# Patient Record
Sex: FEMALE | Race: WHITE | NOT HISPANIC OR LATINO | URBAN - METROPOLITAN AREA
[De-identification: names, ages, dates, MRNs, and addresses within clinical notes are randomized per-mention and may not be internally consistent; named-entity substitution may affect disease eponyms.]

---

## 2017-07-12 NOTE — PATIENT DISCUSSION
POSTERIOR CAPSULAR FIBROSIS, OD:  VISUALLY SIGNIFICANT. OPTION OF YAG LASER VERSUS UPDATING GLASSES VERSUS FOLLOWING DISCUSSED. RBA'S DISCUSSED, PATIENT UNDERSTANDS AND DESIRES YAG LASER TO INCREASE VISION FOR DRIVING, READING AND PROBLEMS WITH GLARE. SCHEDULE YAG LASER OD.

## 2019-10-29 NOTE — PATIENT DISCUSSION
CATARACTS, OS: VISUALLY SIGNIFICANT. OPTION OF SURGERY VERSUS FOLLOWING VERSUS UPDATING GLASSES DISCUSSED. RBA'S DISCUSSED, PATIENT UNDERSTANDS AND DESIRES SURGERY TO INCREASE VISION FOR READING AND WATCHING TV.  SCHEDULE CATARACT SURGERY/PRE-OP OS.

## 2019-11-14 NOTE — PATIENT DISCUSSION
CATARACT OS: RBA'S DISCUSSED, PATIENT UNDERSTANDS AND DESIRES TO PROCEED WITH SURGERY. CONSENT READ AND SIGNED. PATIENT DESIRES PANOPTICS TORIC MULTIFOCAL .

## 2019-12-12 NOTE — PATIENT DISCUSSION
S/P PC IOL, OS. DOING WELL. CONTINUE DROPS AS DIRECTED. SPECS RX OFFERED. RX ARC IN SPECS TO MINIMIZE GLARE. RETURN FOR FOLLOW-UP AS SCHEDULED.

## 2021-10-07 NOTE — PATIENT DISCUSSION
VISUALLY SIGNIFICANT. OPTION OF YAG LASER VERSUS UPDATING GLASSES VERSUS FOLLOWING DISCUSSED. RBA'S DISCUSSED. RBA'S DISCUSSED, PATIENT UNDERSTANDS AND DESIRES YAG LASER TO INCREASE VISION FOR READING, WATCHING TV, AND DRIVING. SCHEDULE YAG OS.

## 2021-10-07 NOTE — PATIENT DISCUSSION
NO HOLES. NO TEARS. RETINAL DETATCHMENT WARNINGS DISCUSSED. FLOATERS AND FLASHES BROCHURE GIVEN. RETURN FOR FOLLOW UP AS SCHEDULED.

## 2021-11-18 NOTE — PATIENT DISCUSSION
STABLE. NOT VISUALLY SIGNIFICANT. REFER PATIENT TO RETINA SPECIALIST IF WORSENS. FOLLOW UP AS SCHEDULED.

## 2022-05-11 ENCOUNTER — PREPPED CHART (OUTPATIENT)
Dept: URBAN - METROPOLITAN AREA CLINIC 19 | Facility: CLINIC | Age: 62
End: 2022-05-11

## 2022-05-11 ENCOUNTER — ESTABLISHED PATIENT (OUTPATIENT)
Dept: URBAN - METROPOLITAN AREA CLINIC 19 | Facility: CLINIC | Age: 62
End: 2022-05-11

## 2022-05-11 DIAGNOSIS — H02.831: ICD-10-CM

## 2022-05-11 DIAGNOSIS — H52.223: ICD-10-CM

## 2022-05-11 DIAGNOSIS — H02.832: ICD-10-CM

## 2022-05-11 DIAGNOSIS — H02.834: ICD-10-CM

## 2022-05-11 DIAGNOSIS — H25.813: ICD-10-CM

## 2022-05-11 DIAGNOSIS — H02.835: ICD-10-CM

## 2022-05-11 PROCEDURE — 92014 COMPRE OPH EXAM EST PT 1/>: CPT

## 2022-05-11 ASSESSMENT — VISUAL ACUITY
OS_CC: 20/30-1
OU_CC: J2
OD_CC: 20/30-1

## 2022-05-11 ASSESSMENT — KERATOMETRY
OD_K1POWER_DIOPTERS: 44.75
OD_K2POWER_DIOPTERS: 46.50
OS_K1POWER_DIOPTERS: 44.75
OS_AXISANGLE2_DEGREES: 108
OD_AXISANGLE2_DEGREES: 85
OS_AXISANGLE_DEGREES: 18
OD_AXISANGLE_DEGREES: 175
OS_K2POWER_DIOPTERS: 46.25

## 2022-05-11 ASSESSMENT — TONOMETRY
OD_IOP_MMHG: 7
OS_IOP_MMHG: 9

## 2023-05-18 ENCOUNTER — ESTABLISHED PATIENT (OUTPATIENT)
Dept: URBAN - METROPOLITAN AREA CLINIC 19 | Facility: CLINIC | Age: 63
End: 2023-05-18

## 2023-05-18 DIAGNOSIS — H25.813: ICD-10-CM

## 2023-05-18 DIAGNOSIS — H02.831: ICD-10-CM

## 2023-05-18 DIAGNOSIS — H52.223: ICD-10-CM

## 2023-05-18 DIAGNOSIS — H02.835: ICD-10-CM

## 2023-05-18 PROCEDURE — 92015 DETERMINE REFRACTIVE STATE: CPT

## 2023-05-18 PROCEDURE — 92014 COMPRE OPH EXAM EST PT 1/>: CPT

## 2023-05-18 ASSESSMENT — TONOMETRY
OS_IOP_MMHG: 10
OD_IOP_MMHG: 10

## 2023-05-18 ASSESSMENT — KERATOMETRY
OD_AXISANGLE2_DEGREES: 85
OS_AXISANGLE2_DEGREES: 108
OS_K2POWER_DIOPTERS: 46.25
OD_K2POWER_DIOPTERS: 46.50
OD_AXISANGLE_DEGREES: 175
OS_AXISANGLE_DEGREES: 18
OD_K1POWER_DIOPTERS: 44.75
OS_K1POWER_DIOPTERS: 44.75

## 2023-05-18 ASSESSMENT — VISUAL ACUITY
OU_CC: 20/25+1
OD_CC: 20/30
OS_CC: 20/25

## 2024-05-23 ENCOUNTER — ESTABLISHED PATIENT (OUTPATIENT)
Dept: URBAN - METROPOLITAN AREA CLINIC 19 | Facility: CLINIC | Age: 64
End: 2024-05-23

## 2024-05-23 DIAGNOSIS — H02.834: ICD-10-CM

## 2024-05-23 DIAGNOSIS — H02.835: ICD-10-CM

## 2024-05-23 DIAGNOSIS — H04.123: ICD-10-CM

## 2024-05-23 DIAGNOSIS — H25.813: ICD-10-CM

## 2024-05-23 DIAGNOSIS — H52.223: ICD-10-CM

## 2024-05-23 DIAGNOSIS — H02.831: ICD-10-CM

## 2024-05-23 DIAGNOSIS — Z79.899: ICD-10-CM

## 2024-05-23 DIAGNOSIS — H02.832: ICD-10-CM

## 2024-05-23 PROCEDURE — 92015 DETERMINE REFRACTIVE STATE: CPT

## 2024-05-23 PROCEDURE — 92014 COMPRE OPH EXAM EST PT 1/>: CPT

## 2024-05-23 PROCEDURE — 92250 FUNDUS PHOTOGRAPHY W/I&R: CPT

## 2024-05-23 ASSESSMENT — KERATOMETRY
OD_AXISANGLE_DEGREES: 173
OD_AXISANGLE2_DEGREES: 83
OD_K1POWER_DIOPTERS: 45.50
OS_K2POWER_DIOPTERS: 47.25
OS_AXISANGLE_DEGREES: 173
OS_AXISANGLE2_DEGREES: 83
OS_K1POWER_DIOPTERS: 44.75
OD_K2POWER_DIOPTERS: 47.25

## 2024-05-23 ASSESSMENT — VISUAL ACUITY
OD_CC: 20/60
OU_CC: 20/25
OS_CC: 20/40
OD_PH: 20/50

## 2024-05-23 ASSESSMENT — TONOMETRY
OD_IOP_MMHG: 10
OS_IOP_MMHG: 12

## 2024-08-14 ENCOUNTER — FOLLOW UP (OUTPATIENT)
Dept: URBAN - METROPOLITAN AREA CLINIC 19 | Facility: CLINIC | Age: 64
End: 2024-08-14

## 2024-08-14 DIAGNOSIS — Z79.899: ICD-10-CM

## 2024-08-14 PROCEDURE — 92134 CPTRZ OPH DX IMG PST SGM RTA: CPT

## 2024-08-14 PROCEDURE — 99212 OFFICE O/P EST SF 10 MIN: CPT

## 2024-08-14 PROCEDURE — 92083 EXTENDED VISUAL FIELD XM: CPT

## 2024-08-14 ASSESSMENT — VISUAL ACUITY
OS_CC: 20/30-1
OU_CC: 20/40
OD_CC: 20/40

## 2024-08-14 ASSESSMENT — TONOMETRY
OD_IOP_MMHG: 10
OS_IOP_MMHG: 10

## 2025-05-28 ENCOUNTER — COMPREHENSIVE EXAM (OUTPATIENT)
Age: 65
End: 2025-05-28

## 2025-05-28 DIAGNOSIS — H02.834: ICD-10-CM

## 2025-05-28 DIAGNOSIS — H04.123: ICD-10-CM

## 2025-05-28 DIAGNOSIS — H02.831: ICD-10-CM

## 2025-05-28 DIAGNOSIS — H25.813: ICD-10-CM

## 2025-05-28 DIAGNOSIS — H02.835: ICD-10-CM

## 2025-05-28 DIAGNOSIS — H52.223: ICD-10-CM

## 2025-05-28 DIAGNOSIS — H02.832: ICD-10-CM

## 2025-05-28 PROCEDURE — 92015 DETERMINE REFRACTIVE STATE: CPT

## 2025-05-28 PROCEDURE — 92014 COMPRE OPH EXAM EST PT 1/>: CPT
